# Patient Record
Sex: MALE | Race: WHITE | NOT HISPANIC OR LATINO | Employment: FULL TIME | URBAN - METROPOLITAN AREA
[De-identification: names, ages, dates, MRNs, and addresses within clinical notes are randomized per-mention and may not be internally consistent; named-entity substitution may affect disease eponyms.]

---

## 2017-01-03 ENCOUNTER — APPOINTMENT (OUTPATIENT)
Dept: URGENT CARE | Age: 27
End: 2017-01-03
Payer: COMMERCIAL

## 2017-01-03 PROCEDURE — 99203 OFFICE O/P NEW LOW 30 MIN: CPT | Performed by: FAMILY MEDICINE

## 2017-11-02 ENCOUNTER — ALLSCRIPTS OFFICE VISIT (OUTPATIENT)
Dept: OTHER | Facility: OTHER | Age: 27
End: 2017-11-02

## 2017-11-02 DIAGNOSIS — F41.8 OTHER SPECIFIED ANXIETY DISORDERS: ICD-10-CM

## 2017-11-02 DIAGNOSIS — Z13.220 ENCOUNTER FOR SCREENING FOR LIPOID DISORDERS: ICD-10-CM
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
## 2017-12-07 ENCOUNTER — OFFICE VISIT (OUTPATIENT)
Dept: URGENT CARE | Age: 27
End: 2017-12-07
Payer: COMMERCIAL

## 2017-12-07 ENCOUNTER — APPOINTMENT (OUTPATIENT)
Dept: LAB | Age: 27
End: 2017-12-07
Payer: COMMERCIAL

## 2017-12-07 ENCOUNTER — APPOINTMENT (OUTPATIENT)
Dept: LAB | Age: 27
End: 2017-12-07
Attending: PHYSICIAN ASSISTANT
Payer: COMMERCIAL

## 2017-12-07 ENCOUNTER — TRANSCRIBE ORDERS (OUTPATIENT)
Dept: ADMINISTRATIVE | Age: 27
End: 2017-12-07

## 2017-12-07 DIAGNOSIS — R30.0 DYSURIA: ICD-10-CM

## 2017-12-07 DIAGNOSIS — F41.8 OTHER SPECIFIED ANXIETY DISORDERS: ICD-10-CM

## 2017-12-07 DIAGNOSIS — Z13.220 ENCOUNTER FOR SCREENING FOR LIPOID DISORDERS: ICD-10-CM

## 2017-12-07 LAB
ALBUMIN SERPL BCP-MCNC: 4.4 G/DL (ref 3.5–5)
ALP SERPL-CCNC: 76 U/L (ref 46–116)
ALT SERPL W P-5'-P-CCNC: 23 U/L (ref 12–78)
ANION GAP SERPL CALCULATED.3IONS-SCNC: 7 MMOL/L (ref 4–13)
AST SERPL W P-5'-P-CCNC: 13 U/L (ref 5–45)
BASOPHILS # BLD AUTO: 0.02 THOUSANDS/ΜL (ref 0–0.1)
BASOPHILS NFR BLD AUTO: 0 % (ref 0–1)
BILIRUB SERPL-MCNC: 0.42 MG/DL (ref 0.2–1)
BILIRUB UR QL STRIP: NEGATIVE
BUN SERPL-MCNC: 19 MG/DL (ref 5–25)
CALCIUM SERPL-MCNC: 9.2 MG/DL (ref 8.3–10.1)
CHLORIDE SERPL-SCNC: 103 MMOL/L (ref 100–108)
CHOLEST SERPL-MCNC: 188 MG/DL (ref 50–200)
CLARITY UR: NORMAL
CO2 SERPL-SCNC: 28 MMOL/L (ref 21–32)
COLOR UR: NORMAL
CREAT SERPL-MCNC: 1.03 MG/DL (ref 0.6–1.3)
EOSINOPHIL # BLD AUTO: 0.06 THOUSAND/ΜL (ref 0–0.61)
EOSINOPHIL NFR BLD AUTO: 1 % (ref 0–6)
ERYTHROCYTE [DISTWIDTH] IN BLOOD BY AUTOMATED COUNT: 12.6 % (ref 11.6–15.1)
GFR SERPL CREATININE-BSD FRML MDRD: 100 ML/MIN/1.73SQ M
GLUCOSE (HISTORICAL): NEGATIVE
GLUCOSE P FAST SERPL-MCNC: 69 MG/DL (ref 65–99)
HCT VFR BLD AUTO: 44.6 % (ref 36.5–49.3)
HDLC SERPL-MCNC: 56 MG/DL (ref 40–60)
HGB BLD-MCNC: 15.6 G/DL (ref 12–17)
HGB UR QL STRIP.AUTO: NEGATIVE
KETONES UR STRIP-MCNC: NEGATIVE MG/DL
LDLC SERPL CALC-MCNC: 96 MG/DL (ref 0–100)
LEUKOCYTE ESTERASE UR QL STRIP: NORMAL
LYMPHOCYTES # BLD AUTO: 2.39 THOUSANDS/ΜL (ref 0.6–4.47)
LYMPHOCYTES NFR BLD AUTO: 23 % (ref 14–44)
MCH RBC QN AUTO: 30.7 PG (ref 26.8–34.3)
MCHC RBC AUTO-ENTMCNC: 35 G/DL (ref 31.4–37.4)
MCV RBC AUTO: 88 FL (ref 82–98)
MONOCYTES # BLD AUTO: 0.77 THOUSAND/ΜL (ref 0.17–1.22)
MONOCYTES NFR BLD AUTO: 7 % (ref 4–12)
NEUTROPHILS # BLD AUTO: 7.28 THOUSANDS/ΜL (ref 1.85–7.62)
NEUTS SEG NFR BLD AUTO: 69 % (ref 43–75)
NITRITE UR QL STRIP: NEGATIVE
NRBC BLD AUTO-RTO: 0 /100 WBCS
PH UR STRIP.AUTO: 5 [PH]
PLATELET # BLD AUTO: 316 THOUSANDS/UL (ref 149–390)
PMV BLD AUTO: 10.2 FL (ref 8.9–12.7)
POTASSIUM SERPL-SCNC: 3.8 MMOL/L (ref 3.5–5.3)
PROT SERPL-MCNC: 8.2 G/DL (ref 6.4–8.2)
PROT UR STRIP-MCNC: NORMAL MG/DL
RBC # BLD AUTO: 5.08 MILLION/UL (ref 3.88–5.62)
SODIUM SERPL-SCNC: 138 MMOL/L (ref 136–145)
SP GR UR STRIP.AUTO: 1.03
TRIGL SERPL-MCNC: 178 MG/DL
TSH SERPL DL<=0.05 MIU/L-ACNC: 1.27 UIU/ML (ref 0.36–3.74)
UROBILINOGEN UR QL STRIP.AUTO: 0.2
WBC # BLD AUTO: 10.55 THOUSAND/UL (ref 4.31–10.16)

## 2017-12-07 PROCEDURE — 99213 OFFICE O/P EST LOW 20 MIN: CPT | Performed by: FAMILY MEDICINE

## 2017-12-07 PROCEDURE — 36415 COLL VENOUS BLD VENIPUNCTURE: CPT

## 2017-12-07 PROCEDURE — 80061 LIPID PANEL: CPT

## 2017-12-07 PROCEDURE — 84443 ASSAY THYROID STIM HORMONE: CPT

## 2017-12-07 PROCEDURE — 85025 COMPLETE CBC W/AUTO DIFF WBC: CPT

## 2017-12-07 PROCEDURE — 81002 URINALYSIS NONAUTO W/O SCOPE: CPT | Performed by: FAMILY MEDICINE

## 2017-12-07 PROCEDURE — S9088 SERVICES PROVIDED IN URGENT: HCPCS | Performed by: FAMILY MEDICINE

## 2017-12-07 PROCEDURE — 87086 URINE CULTURE/COLONY COUNT: CPT

## 2017-12-07 PROCEDURE — 80053 COMPREHEN METABOLIC PANEL: CPT

## 2017-12-08 LAB — BACTERIA UR CULT: NORMAL

## 2017-12-09 NOTE — PROGRESS NOTES
Assessment    1  Acute UTI (599 0) (N39 0)    Plan  Acute UTI    · Ciprofloxacin HCl - 500 MG Oral Tablet; TAKE 1 TABLET TWICE DAILY   · Drink plenty of fluids ; Status:Complete;   Done: 59AOJ5831   · Resume activity to your tolerance ; Status:Complete;   Done: 33NJJ1264   · We suggest that you try a probiotic supplement ; Status:Complete;   Done: 20RFG8586  Dysuria    · (1) URINE CULTURE; Source:Urine, Clean Catch; Status:Active; Requestedfor:84Zhs4279;    · Urine Dip Non-Automated- POC; Status:Complete;   Done: 43JMT5145 07:27PM    Discussion/Summary  Medication Side Effects Reviewed: Possible side effects of new medications were reviewed with the patient/guardian today  Understands and agrees with treatment plan: The treatment plan was reviewed with the patient/guardian  The patient/guardian understands and agrees with the treatment plan   Counseling Documentation With Imm: The patient was counseled regarding instructions for management,-- prognosis,-- patient and family education,-- impressions,-- risks and benefits of treatment options,-- importance of compliance with treatment  Follow Up Instructions: Follow Up with your Primary Care Provider in 3-4 days  If your symptoms worsen, go to the nearest [place] [address] Emergency Department  Chief Complaint    1  Dysuria  Chief Complaint Free Text Note Form: Started with urethral itching and discharge on Sunday  Went to STD clinic 12/5 and had labs, urine and swab done - all WNL  Took Azithromycin 4 tabs 12/5/17  Some improvement in s/s but still has sl itchy and discharge with occ  dysuria  No fever or urgency  History of Present Illness  HPI: Patient with history as noted in chief complaint  She still has some irritation and dysuria  Hospital Based Practices Required Assessment:  Pain Assessment  the patient states they have pain  The pain is located in the dysuria   (on a scale of 0 to 10, the patient rates the pain at 3, at times ranging as high as 7 )  Abuse And Domestic Violence Screen   Yes, the patient is safe at home  -- The patient states no one is hurting them  Depression And Suicide Screen  No, the patient has not had thoughts of hurting themself  No, the patient has not felt depressed in the past 7 days  Prefered Language is  [place]  Primary Language is  English  Dysuria: Rogelio Tran presents with complaints of dysuria  Associated symptoms include urethral discharge, but-- no urgency,-- no frequency,-- no burning,-- no hematuria,-- no nocturia,-- no urethral pain,-- no suprapubic pain,-- no flank pain,-- no low back pain,-- no fever,-- no chills,-- no scrotal pain,-- no nausea-- and-- no vomiting  Review of Systems  Focused-Male:  Constitutional: as noted in HPI  Gastrointestinal: as noted in HPI  Genitourinary: as noted in HPI  Active Problems  1  Acute pain of right shoulder (719 41) (M25 511)   2  Anxiety and depression (300 00,311) (F41 8)   3  Biceps tendinitis of right shoulder (726 12) (M75 21)   4  Paronychia of great toe, left (681 11) (E30 038)    Past Medical History  1  History of asthma (V12 69) (Z87 09)   2  History of candidiasis of mouth (V12 09) (Z86 19)   3  History of discharge from penis (V13 89) (I80 441)   4  History of dysuria (V13 00) (Z87 898)   5  History of glossitis (V12 79) (Z87 19)   6  History of painful urination (V13 89) (Z87 898)   7  History of Shoulder pain (719 41) (M25 519)  Active Problems And Past Medical History Reviewed: The active problems and past medical history were reviewed and updated today  Family History  Mother    1  No pertinent family history  Father    2  No pertinent family history  Family History Reviewed: The family history was reviewed and updated today         Social History   · Caffeine use (V49 89) (F15 90)   · Never a smoker   · No illicit drug use   · No known STD risk factors   · Number of children   · Occasional alcohol use   · Occupation   · Recreational activities   · Single   · Denied: History of Takes dietary supplements  Social History Reviewed: The social history was reviewed and updated today  Surgical History    1  History of Eye Surgery Results Strabismus   2  History of Oral Surgery Tooth Extraction   3  History of Tonsillectomy With Adenoidectomy  Surgical History Reviewed: The surgical history was reviewed and updated today  Current Meds   1  ALPRAZolam 0 25 MG Oral Tablet; Take 1 tablet twice daily as needed; Therapy: 88OUW3200 to (Evaluate:02Dec2017); Last Rx:02Nov2017 Ordered   2  Sertraline HCl - 50 MG Oral Tablet; TAKE 1 TABLET DAILY; Therapy: 98TPG5209 to (Evaluate:31Jan2018)  Requested for: 00MAW9484; Last Rx:02Nov2017 Ordered  Medication List Reviewed: The medication list was reviewed and updated today  Allergies    1  No Known Drug Allergies    Vitals  Signs   Recorded: 30OCQ5041 07:13PM   Temperature: 97 6 F, Oral  Heart Rate: 84  Pulse Quality: Regular  Respiration: 18  Systolic: 163  Diastolic: 70  Height: 5 ft 7 25 in  Weight: 154 lb 6 4 oz  BMI Calculated: 24  BSA Calculated: 1 82  O2 Saturation: 98  Pain Scale: 7    Physical Exam   Constitutional  General appearance: No acute distress, well appearing and well nourished  Abdomen  Abdomen: Non-tender, no masses  Liver and spleen: No hepatomegaly or splenomegaly     Psychiatric  Orientation to person, place and time: Normal    Mood and affect: Normal        Results/Data  Urine Dip Non-Automated- POC 75AVX2919 07:27PM Edis Lewis     Test Name Result Flag Reference   Color Katey     Clarity Hazy     Leukocytes trace     Nitrite negative     Blood negative     Bilirubin negative     Urobilinogen 0 2     Protein trace     Ph 5 0     Specific Gravity 1 030     Ketone negative     Glucose negative         Future Appointments    Date/Time Provider Specialty Site   01/10/2018 09:00 AM Ruma Cavazos, [address] [facility] JUHI Martínez Electronically signed by : Cali Cool, [facility]; Dec  7 2017  8:02PM EST                       (Author)    Electronically signed by : Yojana Sainz DO; Dec  8 2017  3:26PM EST                       (Co-author)

## 2017-12-24 ENCOUNTER — OFFICE VISIT (OUTPATIENT)
Dept: URGENT CARE | Age: 27
End: 2017-12-24
Payer: COMMERCIAL

## 2017-12-24 PROCEDURE — S9088 SERVICES PROVIDED IN URGENT: HCPCS | Performed by: PREVENTIVE MEDICINE

## 2017-12-24 PROCEDURE — 99213 OFFICE O/P EST LOW 20 MIN: CPT | Performed by: PREVENTIVE MEDICINE

## 2017-12-26 NOTE — PROGRESS NOTES
Assessment   1  Acute upper respiratory infection (465 9) (J06 9)    Plan   Acute upper respiratory infection    · Amoxicillin-Pot Clavulanate 875-125 MG Oral Tablet (Augmentin); TAKE 1 TABLET    EVERY 12 HOURS DAILY    Discussion/Summary   Discussion Summary:    Continue over-the-counter remedies  no improvement in 2-3 days, begin Augmentin  Probiotics if taking  fluids/rest or motrin as needed  Medication Side Effects Reviewed: Possible side effects of new medications were reviewed with the patient/guardian today  Understands and agrees with treatment plan: The treatment plan was reviewed with the patient/guardian  The patient/guardian understands and agrees with the treatment plan    Follow Up Instructions: Follow Up with your Primary Care Provider in 5 days  If your symptoms worsen, go to the nearest [place] [address] Emergency Department  Chief Complaint   1  Cold Symptoms  Chief Complaint Free Text Note Form: nasal and chest congestion x 3 days throat      History of Present Illness   HPI: 51-year-old male presents with 3 days of nasal congestion, postnasal drainage, sore throat and productive cough  He has been taking over-the-counter Sudafed, cold and flu medications and ibuprofen  No fevers  + Sinus pressure and facial pain  Hospital Based Practices Required Assessment:      Pain Assessment      the patient states they have pain  (on a scale of 0 to 10, the patient rates the pain at 6 )      Abuse And Domestic Violence Screen       Yes, the patient is safe at home  -- The patient states no one is hurting them  Depression And Suicide Screen  No, the patient has not had thoughts of hurting themself  No, the patient has not felt depressed in the past 7 days  Review of Systems   Focused-Male:      Constitutional: feeling poorly-- and-- feeling tired, but-- no fever  ENT: earache,-- sore throat-- and-- nasal discharge        Respiratory: cough, but-- no shortness of breath-- and-- no wheezing  ROS Reviewed:    ROS reviewed  Active Problems   1  Acute pain of right shoulder (719 41) (M25 511)   2  Acute UTI (599 0) (N39 0)   3  Anxiety and depression (300 00,311) (F41 8)   4  Biceps tendinitis of right shoulder (726 12) (M75 21)   5  Dysuria (788 1) (R30 0)   6  Paronychia of great toe, left (681 11) (C74 426)    Past Medical History   1  History of asthma (V12 69) (Z87 09)   2  History of candidiasis of mouth (V12 09) (Z86 19)   3  History of discharge from penis (V13 89) (J25 316)   4  History of dysuria (V13 00) (Z87 898)   5  History of glossitis (V12 79) (Z87 19)   6  History of painful urination (V13 89) (Z87 898)   7  History of Shoulder pain (719 41) (M25 519)  Active Problems And Past Medical History Reviewed: The active problems and past medical history were reviewed and updated today  Family History   Mother    1  No pertinent family history  Father    2  No pertinent family history  Family History Reviewed: The family history was reviewed and updated today  Social History    · Caffeine use (V49 89) (F15 90)   · Never a smoker   · No illicit drug use   · No known STD risk factors   · Number of children   · Occasional alcohol use   · Occupation   · Recreational activities   · Single   · Denied: History of Takes dietary supplements  Social History Reviewed: The social history was reviewed and updated today  The social history was reviewed and is unchanged  Surgical History   1  History of Eye Surgery Results Strabismus   2  History of Oral Surgery Tooth Extraction   3  History of Tonsillectomy With Adenoidectomy  Surgical History Reviewed: The surgical history was reviewed and updated today  Current Meds    1  ALPRAZolam 0 25 MG Oral Tablet; Take 1 tablet twice daily as needed; Therapy: 46DUQ1586 to (Evaluate:37Xft8117); Last Rx:02Nov2017 Ordered   2  Sertraline HCl - 50 MG Oral Tablet; TAKE 1 TABLET DAILY;      Therapy: 39YFI7975 to (WQZXQL:41KMM3254)  Requested for: 19PCX1635; Last     Rx:02Nov2017 Ordered  Medication List Reviewed: The medication list was reviewed and updated today  Allergies   1  No Known Drug Allergies    Vitals   Signs   Recorded: 86Owc4132 03:28PM   Temperature: 98 1 F, Temporal  Heart Rate: 90  Respiration: 20  Systolic: 903  Diastolic: 70  Height: 5 ft 8 in  Weight: 155 lb   BMI Calculated: 23 57  BSA Calculated: 1 83  O2 Saturation: 98  Pain Scale: 6    Physical Exam        Constitutional      General appearance: No acute distress, well appearing and well nourished  Ears, Nose, Mouth, and Throat      External inspection of ears and nose: Normal        Otoscopic examination: Tympanic membrance translucent with normal light reflex  Canals patent without erythema  Nasal mucosa, septum, and turbinates: Abnormal   There was a mucoid discharge from both nares  The bilateral nasal mucosa was edematous  Oropharynx: Abnormal   The posterior pharynx was erythematous, but-- did not have an exudate  Pulmonary      Respiratory effort: Abnormal   Respiratory rate: normal  Respiratory Findings: wet cough  Auscultation of lungs: Clear to auscultation         Psychiatric      Orientation to person, place and time: Normal        Mood and affect: Normal        Future Appointments      Date/Time Provider Specialty Site   01/10/2018 09:00 AM Jesika Long, 10 [facility] [place] [facility] JUHI Quiñones     Signatures    Electronically signed by : Conrado Vargas, [facility]; Dec 24 2017  3:50PM EST                       (Author)     Electronically signed by : Jorgito Fragoso DO; Dec 25 2017  8:39PM EST                       (Co-author)

## 2017-12-28 ENCOUNTER — OFFICE VISIT (OUTPATIENT)
Dept: URGENT CARE | Age: 27
End: 2017-12-28
Payer: COMMERCIAL

## 2017-12-28 PROCEDURE — 99213 OFFICE O/P EST LOW 20 MIN: CPT | Performed by: FAMILY MEDICINE

## 2017-12-28 PROCEDURE — S9088 SERVICES PROVIDED IN URGENT: HCPCS | Performed by: FAMILY MEDICINE

## 2017-12-29 NOTE — PROGRESS NOTES
Assessment   1  Glossitis (529 0) (K14 0)   2  Candidiasis of mouth (112 0) (B37 0)    Plan   Candidiasis of mouth, Glossitis    · Nystatin 212169 UNIT/ML Mouth/Throat Suspension; SWISH AND SWALLOW 5ML    4 TIMES DAILY    Discussion/Summary   Discussion Summary:    Use antifungal swish as directed  After 3-4 days of medication, change tooth brush  You may use Mucinex and Flonase OTC as directed for continued relief of congestion symptoms  Ibuprofen and tylenol for fever and discomfort  If symptoms worsen or if not resolving, call your PCP or go to the ER  Precautions given  All questions answered  Medication Side Effects Reviewed: Possible side effects of new medications were reviewed with the patient/guardian today  Understands and agrees with treatment plan: The treatment plan was reviewed with the patient/guardian  The patient/guardian understands and agrees with the treatment plan    Counseling Documentation With Imm: The patient was counseled regarding instructions for management,-- risk factor reductions,-- patient and family education,-- impressions  Follow Up Instructions: Follow Up with your Primary Care Provider in 5 days  If your symptoms worsen, go to the nearest [place] [address] Emergency Department  Chief Complaint   1  Tongue Abnormalities  Chief Complaint Free Text Note Form: On Augmentin since 12/24/17 for URI  Improving but noted tongue tenderness x several days and white plague on tongue yesterday  History of Present Illness   HPI: Patient is a 23 y/o male presenting with complaint of oral thrush x yesterday  Soreness, redness, and swelling of tongue for 2 days prior to onset of white coating  Patient notes he had this once before with illness requiring antibiotic several months ago, with similar prodrome and presentation of sx  He was seen 4 days ago for tx of a URI and started on Augmentil   Prior to that he was seen earlier in the month and had a short course of Ciprofloxacin for a UTI  He continues to have sx  of nasal congestion and maxillary sinus congestion, however, feels 70% better  He is not taking any OTC medications  Hospital Based Practices Required Assessment:      Pain Assessment      the patient states they have pain  The pain is located in the tongue  The patient describes the pain as tenderness  (on a scale of 0 to 10, the patient rates the pain at 7 )      Abuse And Domestic Violence Screen       Yes, the patient is safe at home  -- The patient states no one is hurting them  Depression And Suicide Screen  No, the patient has not had thoughts of hurting themself  No, the patient has not felt depressed in the past 7 days  Prefered Language is  [place]  Primary Language is  English  Review of Systems   Focused-Male:      Constitutional: no fever-- and-- no chills  ENT: nasal discharge-- and-- No dysphagia--       The patient presents with complaints of no earache (Resolved)  The patient presents with complaints of no sore throat (resolved)      Cardiovascular: no chest pain-- and-- no palpitations  Respiratory: no shortness of breath,-- no cough-- and-- no wheezing  Gastrointestinal: no abdominal pain,-- no nausea,-- no vomiting-- and-- no diarrhea  Neurological: no headache  ROS Reviewed:    ROS reviewed  Active Problems   1  Acute pain of right shoulder (719 41) (M25 511)   2  Acute upper respiratory infection (465 9) (J06 9)   3  Acute UTI (599 0) (N39 0)   4  Anxiety and depression (300 00,311) (F41 8)   5  Biceps tendinitis of right shoulder (726 12) (M75 21)   6  Dysuria (788 1) (R30 0)   7  Paronychia of great toe, left (681 11) (E14 237)    Past Medical History   1  History of asthma (V12 69) (Z87 09)   2  History of discharge from penis (V13 89) (D09 375)   3  History of dysuria (V13 00) (Z87 898)   4  History of painful urination (V13 89) (Z87 898)   5   History of Shoulder pain (719 41) (M25 519)  Active Problems And Past Medical History Reviewed: The active problems and past medical history were reviewed and updated today  Family History   Mother    1  No pertinent family history  Father    2  No pertinent family history  Family History Reviewed: The family history was reviewed and updated today  Social History    · Caffeine use (V49 89) (F15 90)   · Never a smoker   · No illicit drug use   · No known STD risk factors   · Number of children   · Occasional alcohol use   · Occupation   · Recreational activities   · Single   · Denied: History of Takes dietary supplements  Social History Reviewed: The social history was reviewed and updated today  The social history was reviewed and is unchanged  Surgical History   1  History of Eye Surgery Results Strabismus   2  History of Oral Surgery Tooth Extraction   3  History of Tonsillectomy With Adenoidectomy  Surgical History Reviewed: The surgical history was reviewed and updated today  Current Meds    1  ALPRAZolam 0 25 MG Oral Tablet; Take 1 tablet twice daily as needed; Therapy: 53WMF8937 to (Evaluate:02Dec2017); Last Rx:02Nov2017 Ordered   2  Amoxicillin-Pot Clavulanate 875-125 MG Oral Tablet; TAKE 1 TABLET EVERY 12 HOURS     DAILY; Therapy: 40CVR0312 to ([phone])  Requested for: 24Dec2017; Last     Rx:24Dec2017 Ordered   3  Sertraline HCl - 50 MG Oral Tablet; TAKE 1 TABLET DAILY; Therapy: 55LSV2490 to (Evaluate:31Jan2018)  Requested for: 59CMK9628; Last     Rx:02Nov2017 Ordered  Medication List Reviewed: The medication list was reviewed and updated today  Allergies   1   No Known Drug Allergies    Vitals   Signs   Recorded: 28Dec2017 04:30PM   Temperature: 97 9 F, Oral  Heart Rate: 79  Pulse Quality: Regular  Respiration: 18  Systolic: 531, RUE, Sitting  Diastolic: 60, RUE, Sitting  Height: 5 ft 7 5 in  Weight: 155 lb   BMI Calculated: 23 92  BSA Calculated: 1 82  O2 Saturation: 97  Pain Scale: 7    Physical Exam        Constitutional      General appearance: No acute distress, well appearing and well nourished  Eyes      Conjunctiva and lids: No swelling, erythema, or discharge  Ears, Nose, Mouth, and Throat Tongue erythematous with mild swelling present  Small, furry white plaques present on the central posterior tongue  Erythematous posterior pharynx with clear PND  Tonsils grade 0  No exudate, edema, petechiae, or other lesions of the posterior pharynx  Pulmonary      Respiratory effort: No increased work of breathing or signs of respiratory distress  Auscultation of lungs: Clear to auscultation  Cardiovascular      Auscultation of heart: Normal rate and rhythm, normal S1 and S2, without murmurs  Musculoskeletal      Gait and station: Normal        Digits and nails: Normal without clubbing or cyanosis  Neurologic no focal deficits  Reflexes: 2+ and symmetric         Psychiatric      Orientation to person, place and time: Normal        Mood and affect: Normal        Future Appointments      Date/Time Provider Specialty Site   01/10/2018 09:00 AM Natty King, [facility] [email or web address]     Signatures    Electronically signed by : Savanah Weiss, [facility]; Dec 28 2017  4:54PM EST                       (Author)     Electronically signed by : Anoop Nair DO; Dec 28 2017  4:56PM EST                       (Co-author)

## 2018-01-08 ENCOUNTER — ALLSCRIPTS OFFICE VISIT (OUTPATIENT)
Dept: OTHER | Facility: OTHER | Age: 28
End: 2018-01-08

## 2018-01-08 ENCOUNTER — TRANSCRIBE ORDERS (OUTPATIENT)
Dept: ADMINISTRATIVE | Age: 28
End: 2018-01-08

## 2018-01-08 ENCOUNTER — GENERIC CONVERSION - ENCOUNTER (OUTPATIENT)
Dept: INTERNAL MEDICINE CLINIC | Facility: CLINIC | Age: 28
End: 2018-01-08

## 2018-01-08 ENCOUNTER — APPOINTMENT (OUTPATIENT)
Dept: LAB | Age: 28
End: 2018-01-08
Payer: COMMERCIAL

## 2018-01-08 DIAGNOSIS — M89.371 HYPERTROPHY OF BONE, RIGHT ANKLE AND FOOT: ICD-10-CM

## 2018-01-08 DIAGNOSIS — Z01.812 PRE-OPERATIVE LABORATORY EXAMINATION: Primary | ICD-10-CM

## 2018-01-08 DIAGNOSIS — Z01.812 PRE-OPERATIVE LABORATORY EXAMINATION: ICD-10-CM

## 2018-01-08 LAB
ANION GAP SERPL CALCULATED.3IONS-SCNC: 4 MMOL/L (ref 4–13)
BASOPHILS # BLD AUTO: 0.01 THOUSANDS/ΜL (ref 0–0.1)
BASOPHILS NFR BLD AUTO: 0 % (ref 0–1)
BUN SERPL-MCNC: 17 MG/DL (ref 5–25)
CALCIUM SERPL-MCNC: 9 MG/DL (ref 8.3–10.1)
CHLORIDE SERPL-SCNC: 107 MMOL/L (ref 100–108)
CO2 SERPL-SCNC: 30 MMOL/L (ref 21–32)
CREAT SERPL-MCNC: 0.83 MG/DL (ref 0.6–1.3)
EOSINOPHIL # BLD AUTO: 0.07 THOUSAND/ΜL (ref 0–0.61)
EOSINOPHIL NFR BLD AUTO: 1 % (ref 0–6)
ERYTHROCYTE [DISTWIDTH] IN BLOOD BY AUTOMATED COUNT: 12.8 % (ref 11.6–15.1)
GFR SERPL CREATININE-BSD FRML MDRD: 122 ML/MIN/1.73SQ M
GLUCOSE SERPL-MCNC: 91 MG/DL (ref 65–140)
HCT VFR BLD AUTO: 41 % (ref 36.5–49.3)
HGB BLD-MCNC: 14.2 G/DL (ref 12–17)
LYMPHOCYTES # BLD AUTO: 2.16 THOUSANDS/ΜL (ref 0.6–4.47)
LYMPHOCYTES NFR BLD AUTO: 27 % (ref 14–44)
MCH RBC QN AUTO: 30.6 PG (ref 26.8–34.3)
MCHC RBC AUTO-ENTMCNC: 34.6 G/DL (ref 31.4–37.4)
MCV RBC AUTO: 88 FL (ref 82–98)
MONOCYTES # BLD AUTO: 0.46 THOUSAND/ΜL (ref 0.17–1.22)
MONOCYTES NFR BLD AUTO: 6 % (ref 4–12)
NEUTROPHILS # BLD AUTO: 5.36 THOUSANDS/ΜL (ref 1.85–7.62)
NEUTS SEG NFR BLD AUTO: 66 % (ref 43–75)
NRBC BLD AUTO-RTO: 0 /100 WBCS
PLATELET # BLD AUTO: 321 THOUSANDS/UL (ref 149–390)
PMV BLD AUTO: 10.5 FL (ref 8.9–12.7)
POTASSIUM SERPL-SCNC: 4 MMOL/L (ref 3.5–5.3)
RBC # BLD AUTO: 4.64 MILLION/UL (ref 3.88–5.62)
SODIUM SERPL-SCNC: 141 MMOL/L (ref 136–145)
WBC # BLD AUTO: 8.07 THOUSAND/UL (ref 4.31–10.16)

## 2018-01-08 PROCEDURE — 80048 BASIC METABOLIC PNL TOTAL CA: CPT

## 2018-01-08 PROCEDURE — 85025 COMPLETE CBC W/AUTO DIFF WBC: CPT

## 2018-01-08 PROCEDURE — 36415 COLL VENOUS BLD VENIPUNCTURE: CPT

## 2018-01-10 ENCOUNTER — GENERIC CONVERSION - ENCOUNTER (OUTPATIENT)
Dept: OTHER | Facility: OTHER | Age: 28
End: 2018-01-10

## 2018-01-11 ENCOUNTER — ANESTHESIA EVENT (OUTPATIENT)
Dept: PERIOP | Facility: HOSPITAL | Age: 28
End: 2018-01-11
Payer: COMMERCIAL

## 2018-01-11 RX ORDER — SERTRALINE HYDROCHLORIDE 25 MG/1
50 TABLET, FILM COATED ORAL
COMMUNITY

## 2018-01-11 RX ORDER — ALPRAZOLAM 0.25 MG/1
0.25 TABLET ORAL 2 TIMES DAILY PRN
COMMUNITY

## 2018-01-11 NOTE — PRE-PROCEDURE INSTRUCTIONS
Pre-Surgery Instructions:   Medication Instructions    ALPRAZolam (XANAX) 0 25 mg tablet Instructed patient per Anesthesia Guidelines   sertraline (ZOLOFT) 25 mg tablet Instructed patient per Anesthesia Guidelines  Instructed to take alprazolam if needed am ofs urgery with sip of water per anesthesia

## 2018-01-12 ENCOUNTER — HOSPITAL ENCOUNTER (OUTPATIENT)
Facility: HOSPITAL | Age: 28
Setting detail: OUTPATIENT SURGERY
Discharge: HOME | End: 2018-01-12
Attending: PODIATRIST | Admitting: PODIATRIST
Payer: COMMERCIAL

## 2018-01-12 ENCOUNTER — APPOINTMENT (OUTPATIENT)
Dept: RADIOLOGY | Facility: HOSPITAL | Age: 28
End: 2018-01-12
Payer: COMMERCIAL

## 2018-01-12 ENCOUNTER — ANESTHESIA (OUTPATIENT)
Dept: PERIOP | Facility: HOSPITAL | Age: 28
End: 2018-01-12
Payer: COMMERCIAL

## 2018-01-12 ENCOUNTER — HOSPITAL ENCOUNTER (OUTPATIENT)
Dept: RADIOLOGY | Facility: HOSPITAL | Age: 28
Setting detail: OUTPATIENT SURGERY
Discharge: HOME | End: 2018-01-12
Payer: COMMERCIAL

## 2018-01-12 VITALS
DIASTOLIC BLOOD PRESSURE: 60 MMHG | SYSTOLIC BLOOD PRESSURE: 110 MMHG | TEMPERATURE: 98.1 F | HEIGHT: 68 IN | HEART RATE: 83 BPM | WEIGHT: 155 LBS | BODY MASS INDEX: 23.49 KG/M2 | OXYGEN SATURATION: 96 % | RESPIRATION RATE: 16 BRPM

## 2018-01-12 DIAGNOSIS — M79.671 RIGHT FOOT PAIN: Primary | ICD-10-CM

## 2018-01-12 DIAGNOSIS — M89.371 HYPERTROPHY OF BONE, RIGHT ANKLE AND FOOT: ICD-10-CM

## 2018-01-12 PROCEDURE — 73630 X-RAY EXAM OF FOOT: CPT

## 2018-01-12 RX ORDER — ONDANSETRON 2 MG/ML
4 INJECTION INTRAMUSCULAR; INTRAVENOUS EVERY 6 HOURS PRN
Status: DISCONTINUED | OUTPATIENT
Start: 2018-01-12 | End: 2018-01-12 | Stop reason: HOSPADM

## 2018-01-12 RX ORDER — ONDANSETRON 2 MG/ML
INJECTION INTRAMUSCULAR; INTRAVENOUS AS NEEDED
Status: DISCONTINUED | OUTPATIENT
Start: 2018-01-12 | End: 2018-01-12 | Stop reason: SURG

## 2018-01-12 RX ORDER — MAGNESIUM HYDROXIDE 1200 MG/15ML
LIQUID ORAL AS NEEDED
Status: DISCONTINUED | OUTPATIENT
Start: 2018-01-12 | End: 2018-01-12 | Stop reason: HOSPADM

## 2018-01-12 RX ORDER — FENTANYL CITRATE 50 UG/ML
INJECTION, SOLUTION INTRAMUSCULAR; INTRAVENOUS AS NEEDED
Status: DISCONTINUED | OUTPATIENT
Start: 2018-01-12 | End: 2018-01-12 | Stop reason: SURG

## 2018-01-12 RX ORDER — DEXAMETHASONE SODIUM PHOSPHATE 4 MG/ML
INJECTION, SOLUTION INTRA-ARTICULAR; INTRALESIONAL; INTRAMUSCULAR; INTRAVENOUS; SOFT TISSUE AS NEEDED
Status: DISCONTINUED | OUTPATIENT
Start: 2018-01-12 | End: 2018-01-12 | Stop reason: HOSPADM

## 2018-01-12 RX ORDER — FENTANYL CITRATE 50 UG/ML
50 INJECTION, SOLUTION INTRAMUSCULAR; INTRAVENOUS
Status: DISCONTINUED | OUTPATIENT
Start: 2018-01-12 | End: 2018-01-12 | Stop reason: HOSPADM

## 2018-01-12 RX ORDER — ACETAMINOPHEN 325 MG/1
650 TABLET ORAL EVERY 4 HOURS PRN
Status: DISCONTINUED | OUTPATIENT
Start: 2018-01-12 | End: 2018-01-12 | Stop reason: HOSPADM

## 2018-01-12 RX ORDER — OXYCODONE HYDROCHLORIDE 5 MG/1
10 TABLET ORAL EVERY 6 HOURS PRN
Status: DISCONTINUED | OUTPATIENT
Start: 2018-01-12 | End: 2018-01-12 | Stop reason: HOSPADM

## 2018-01-12 RX ORDER — PROPOFOL 10 MG/ML
INJECTION, EMULSION INTRAVENOUS AS NEEDED
Status: DISCONTINUED | OUTPATIENT
Start: 2018-01-12 | End: 2018-01-12 | Stop reason: SURG

## 2018-01-12 RX ORDER — OXYCODONE HYDROCHLORIDE 5 MG/1
5 TABLET ORAL EVERY 4 HOURS PRN
Status: DISCONTINUED | OUTPATIENT
Start: 2018-01-12 | End: 2018-01-12 | Stop reason: HOSPADM

## 2018-01-12 RX ORDER — SODIUM CHLORIDE 9 MG/ML
125 INJECTION, SOLUTION INTRAVENOUS CONTINUOUS
Status: DISCONTINUED | OUTPATIENT
Start: 2018-01-12 | End: 2018-01-12 | Stop reason: HOSPADM

## 2018-01-12 RX ORDER — MIDAZOLAM HYDROCHLORIDE 1 MG/ML
INJECTION INTRAMUSCULAR; INTRAVENOUS AS NEEDED
Status: DISCONTINUED | OUTPATIENT
Start: 2018-01-12 | End: 2018-01-12 | Stop reason: SURG

## 2018-01-12 RX ORDER — BUPIVACAINE HYDROCHLORIDE 5 MG/ML
INJECTION, SOLUTION PERINEURAL AS NEEDED
Status: DISCONTINUED | OUTPATIENT
Start: 2018-01-12 | End: 2018-01-12 | Stop reason: HOSPADM

## 2018-01-12 RX ADMIN — FENTANYL CITRATE 50 MCG: 50 INJECTION INTRAMUSCULAR; INTRAVENOUS at 11:52

## 2018-01-12 RX ADMIN — MIDAZOLAM HYDROCHLORIDE 2 MG: 1 INJECTION, SOLUTION INTRAMUSCULAR; INTRAVENOUS at 11:12

## 2018-01-12 RX ADMIN — FENTANYL CITRATE 25 MCG: 50 INJECTION INTRAMUSCULAR; INTRAVENOUS at 12:05

## 2018-01-12 RX ADMIN — ONDANSETRON HYDROCHLORIDE 4 MG: 2 INJECTION, SOLUTION INTRAVENOUS at 12:17

## 2018-01-12 RX ADMIN — SODIUM CHLORIDE 125 ML/HR: 0.9 INJECTION, SOLUTION INTRAVENOUS at 08:38

## 2018-01-12 RX ADMIN — FENTANYL CITRATE 50 MCG: 50 INJECTION INTRAMUSCULAR; INTRAVENOUS at 12:31

## 2018-01-12 RX ADMIN — DEXAMETHASONE SODIUM PHOSPHATE 4 MG: 10 INJECTION INTRAMUSCULAR; INTRAVENOUS at 11:28

## 2018-01-12 RX ADMIN — SODIUM CHLORIDE: 0.9 INJECTION, SOLUTION INTRAVENOUS at 12:16

## 2018-01-12 RX ADMIN — FENTANYL CITRATE 50 MCG: 50 INJECTION INTRAMUSCULAR; INTRAVENOUS at 12:20

## 2018-01-12 RX ADMIN — PROPOFOL 200 MG: 10 INJECTION, EMULSION INTRAVENOUS at 11:19

## 2018-01-12 RX ADMIN — CEFAZOLIN SODIUM 1000 MG: 1 SOLUTION INTRAVENOUS at 11:23

## 2018-01-12 RX ADMIN — FENTANYL CITRATE 75 MCG: 50 INJECTION INTRAMUSCULAR; INTRAVENOUS at 11:22

## 2018-01-12 NOTE — OP NOTE
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]    Procedure(s) (LRB):  NAVICULAR EXOSTECTOMY (Right)    Specimen(s):  * No specimens in log *    Estimated Blood Loss:   Minimal       Anesthesia Type:   IV Sedation with Anesthesia and a total of 8 cc of 1:1 mixture 1% lidocaine plain 0 5% Marcaine plain    Operative Indications:  Hypertrophy of bone, 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]    Hemostasis:  Pneumatic ankle tourniquet at 250 Hg for 48 minutes    Operative Findings:  Consistent with diagnosis, type 1 accessory navicular bone    Complications:   None    Procedure and Technique:  Under mild sedation, the patient was brought into the operating room and placed on the operating room table in the supine position  A pneumatic ankle tourniquet was then placed around the patient's right ankle with ample webril padding  A time out was performed to confirm the correct patient, procedure and site with all parties in agreement  Following IV sedation, local anesthetic was obtained about the patient's right foot was performed consisting of 13 ml of 1% Lidocaine and 0 5% Bupivacaine in a 1:1 mixture  The foot was then scrubbed, prepped and draped in the usual aseptic manner  An esmarch bandage was utilized to exsangunate the patients foot and the pneumatic ankle tourniquet was then inflated  The esmarch bandage was removed and the foot was placed on the operating room table  Attention was then directed to the medial aspect of the right foot where prominent navicular tuberosity was noted, incision was drawn out overlying this    An incision was made down into subcutaneous tissues taking care to retract all neurovascular structures as necessary and cauterize bleeders  Utilizing a combination sharp and blunt dissection dissection was carried down level of posterior tibial tendon which was transected in transverse type fashion  Utilizing a combination of blade and Key elevator tendon was reflected off the navicular tuberosity dorsally and plantarly to expose the prominent exostosis  Of note, a type 1 accessory navicular bone was identified and noted to be mobile along the proximal aspect of the navicular  Utilizing sterile 15 blade this was circumscribed and removed from the wound and placed on the back table  The wound was rinsed with copious amounts of sterile saline, the capsule and tendon was reapproximated utilizing 3 0 Vicryl, subcutaneous tissues were closed utilizing 4 Vicryl skin closure was obtained with 4 O nylon placed combination of simple interrupted and horizontal mattress type fashion  0 5 cc of dexamethasone was placed in the wound  A postoperative injection consisting of 4 ml of 0 5% Bupivacaine was performed  The incision site was then dressed with Betadine, Dry sterile dressing  The pneumatic ankle tourniquet was deflated and normal hyperemic flush was noted to all digits  The patient tolerated the procedure and anesthesia well and was transported to the PACU with vital signs stable        I was present for the entire procedure    Patient Disposition:  PACU  and extubated and stable    SIGNATURE: Nell Mckeon  DATE: January 12, 2018  TIME: 12:25 PM

## 2018-01-12 NOTE — ANESTHESIA PREPROCEDURE EVALUATION
Review of Systems/Medical History  Patient summary reviewed  Chart reviewed  History of anesthetic complications PONV    Cardiovascular  Negative cardio ROS    Pulmonary  Negative pulmonary ROS ,        GI/Hepatic  Negative GI/hepatic ROS          Negative  ROS        Endo/Other  Negative endo/other ROS      GYN  Negative gynecology ROS          Hematology  Negative hematology ROS      Musculoskeletal  Negative musculoskeletal ROS        Neurology  Negative neurology ROS      Psychology   Anxiety,            Physical Exam    Airway    Mallampati score: I  TM Distance: >3 FB  Neck ROM: full     Dental   No notable dental hx     Cardiovascular  Comment: Negative ROS, Rhythm: regular, Rate: normal, Cardiovascular exam normal    Pulmonary  Pulmonary exam normal Breath sounds clear to auscultation,     Other Findings        Anesthesia Plan  ASA Score- 2     Anesthesia Type- IV sedation with anesthesia with ASA Monitors  Additional Monitors:   Airway Plan:         Plan Factors-Patient not instructed to abstain from smoking on day of procedure  Patient did not smoke on day of surgery  Induction- intravenous  Postoperative Plan- Plan for postoperative opioid use  Informed Consent- Anesthetic plan and risks discussed with patient

## 2018-01-12 NOTE — DISCHARGE SUMMARY
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

## 2018-01-12 NOTE — DISCHARGE INSTRUCTIONS
[facility]  Dr Stephanie Rodriguez  Post-Operative Instructions    1  Take your prescribed medication as directed  2  Upon arrival at home, lie down and elevate your surgical foot on 2 pillows  3  Remain quiet, off your feet as much as possible, for the first 24-48 hours  This is when your feet first swell and may become painful  After 48 hours you may begin limited walking following these restrictions:   Partial weightbearing to heel of surgical foot  4  Drink large quantities of water and citrus fruit juice  Consume no alcohol  Continue a well-balanced diet  5  Report any unusual discomfort or fever to this office  6  A limited amount of discomfort and swelling is to be expected  In some cases the skin may take on a bruised appearance  The surgical solution that was applied to your foot prior to the operation is dark in color and the operation site may appear to be oozing when it actually is not  7  A slight amount of blood is to be expected, and is no cause for alarm  Do not remove the dressings  If there is active bleeding and if the bleeding persists, add additional gauze to the bandage, apply direct pressure, elevate your feet and call this office  8  Do not get the dressings wet  As regular bathing may be inconvenient, sponge baths are recommended  9  When anesthesia wears off and if any discomfort should be present, apply an ice pack directly over the operated area for 15 minute intervals for several hours or until the pain leaves  (USE IN EXCESS OF 15 MINUTES COULD CAUSE FROSTBITE)  Do not use hot water bags or electric pads  A convenient icepack can be made by placing ice cubes in a plastic bag and covering this with a towel  10  If necessary, take a mild laxative before retiring  11  Wear your special open shoes anytime you put weight on your foot, even if it is just to walk to the bathroom and back   It will probably be 2 or 3 weeks before you will be permitted to try regular bravo   12  Having performed the operation, we are interested in a prompt recovery  Please cooperate by following the above instructions  13  Please call to confirm your post-op appointment or call with any other questions

## 2018-01-14 VITALS
WEIGHT: 153.6 LBS | OXYGEN SATURATION: 99 % | HEIGHT: 67 IN | TEMPERATURE: 98.5 F | SYSTOLIC BLOOD PRESSURE: 110 MMHG | RESPIRATION RATE: 18 BRPM | HEART RATE: 100 BPM | DIASTOLIC BLOOD PRESSURE: 80 MMHG | BODY MASS INDEX: 24.11 KG/M2

## 2018-01-22 VITALS
HEART RATE: 79 BPM | DIASTOLIC BLOOD PRESSURE: 74 MMHG | HEIGHT: 68 IN | SYSTOLIC BLOOD PRESSURE: 118 MMHG | WEIGHT: 155.38 LBS | TEMPERATURE: 98.5 F | OXYGEN SATURATION: 98 % | BODY MASS INDEX: 23.55 KG/M2

## 2018-01-23 VITALS
OXYGEN SATURATION: 98 % | BODY MASS INDEX: 23.49 KG/M2 | TEMPERATURE: 98.1 F | SYSTOLIC BLOOD PRESSURE: 118 MMHG | HEART RATE: 90 BPM | WEIGHT: 155 LBS | RESPIRATION RATE: 20 BRPM | HEIGHT: 68 IN | DIASTOLIC BLOOD PRESSURE: 70 MMHG

## 2018-01-23 VITALS
HEIGHT: 67 IN | RESPIRATION RATE: 18 BRPM | OXYGEN SATURATION: 98 % | SYSTOLIC BLOOD PRESSURE: 100 MMHG | TEMPERATURE: 97.6 F | DIASTOLIC BLOOD PRESSURE: 70 MMHG | WEIGHT: 154.4 LBS | HEART RATE: 84 BPM | BODY MASS INDEX: 24.23 KG/M2

## 2018-01-23 VITALS
HEART RATE: 79 BPM | OXYGEN SATURATION: 97 % | TEMPERATURE: 97.9 F | SYSTOLIC BLOOD PRESSURE: 108 MMHG | BODY MASS INDEX: 23.49 KG/M2 | DIASTOLIC BLOOD PRESSURE: 60 MMHG | WEIGHT: 155 LBS | HEIGHT: 68 IN | RESPIRATION RATE: 18 BRPM

## 2018-01-23 NOTE — CONSULTS
Chief Complaint  Patient is here for a Pre-Op exam  He is scheduled for Osteotomy Right Navicular for hypertrophy of bone right foot at [facility] on 1/12/2018 by Dr Mickey Hodges, [facility] [address] [place] [address], [address] phone: [phone] fax: [phone]  History of Present Illness  Pre-Op Visit (Brief): The patient is being seen for a preoperative visit  Surgical Risk Assessment:   Prior Anesthesia: He had prior anesthesia and no prior adverse reaction to general anesthesia  Pertinent Past Medical History: Depression/Anxiety  Exercise Capacity: able to walk four blocks without symptoms and able to walk two flights of stairs without symptoms  Lifestyle Factors: denies tobacco use, denies illegal drug use and Occasional EtOH use approx 1 night per week, 3-4 mixed drinks  Symptoms: no easy bleeding, no easy bruising, no chest pain, no cough, no dyspnea, no edema, no palpitations and no wheezing  STOP questionnaire score is 1  Other FRANCIA risk factors include male gender, but normal BMI, normal neck circumference and age under 48  Predicted risk of FRANCIA: None  Pertinent Family History: no fam hx of adverse rxn to anesthesia  Living Situation: home is secure and supportive and no post-op concerns with his living situation  Review of Systems    Constitutional: no fever and no chills  Cardiovascular: no chest pain and no palpitations  Respiratory: as noted in HPI  Gastrointestinal: no abdominal pain, no nausea, no vomiting, no constipation, no diarrhea and no blood in stools  Genitourinary: no dysuria  ROS reviewed  Active Problems    1  Acute pain of right shoulder (719 41) (M25 511)   2  Acute upper respiratory infection (465 9) (J06 9)   3  Acute UTI (599 0) (N39 0)   4  Anxiety and depression (300 00,311) (F41 8)   5  Biceps tendinitis of right shoulder (726 12) (M75 21)   6  Candidiasis of mouth (112 0) (B37 0)   7  Dysuria (788 1) (R30 0)   8  Glossitis (529 0) (K14 0)   9  Paronychia of great toe, left (681 11) (W54 624)    Past Medical History    · History of asthma (V12 69) (Z87 09)   · History of discharge from penis (V13 89) (H54 804)   · History of dysuria (V13 00) (U04 247)   · History of painful urination (V13 89) (K59 601)   · History of Shoulder pain (719 41) (M25 519)    The active problems and past medical history were reviewed and updated today  Surgical History    · History of Eye Surgery Results Strabismus   · History of Oral Surgery Tooth Extraction   · History of Tonsillectomy With Adenoidectomy    The surgical history was reviewed and updated today  Family History    · No pertinent family history    · No pertinent family history    The family history was reviewed and updated today  Social History    · Caffeine use (V49 89) (F15 90)   · He admits to consuming caffeine via coffee ( 2 servings per month ) and tea ( 1 serving      per week ) soda (1 serving per day ) and chocolate ( 1 serving per month)   · Never a smoker   · No illicit drug use   · No known STD risk factors   · Number of children   · none   · Occasional alcohol use   · Occupation   · Student   · Recreational activities   · He enjoys rifle, wreck team and diving r   · Single   · Denied: History of Takes dietary supplements  The social history was reviewed and updated today  The social history was reviewed and is unchanged  Current Meds   1  ALPRAZolam 0 25 MG Oral Tablet; Take 1 tablet twice daily as needed; Therapy: 78SYS1146 to (Evaluate:77Mru2435); Last Rx:02Nov2017 Ordered   2  Sertraline HCl - 50 MG Oral Tablet; TAKE 1 TABLET DAILY; Therapy: 16GNF8332 to (Evaluate:31Jan2018)  Requested for: 53CFY6059; Last   Rx:02Nov2017 Ordered    The medication list was reviewed and updated today  Allergies    1   No Known Drug Allergies    Vitals  Signs    Temperature: 98 5 F, Tympanic  Heart Rate: 79  Systolic: 439, LUE, Sitting  Diastolic: 74, LUE, Sitting  Height: 5 ft 7 64 in  Weight: 155 lb 6 oz  BMI Calculated: 23 88  BSA Calculated: 1 83  O2 Saturation: 98    Physical Exam    Constitutional   General appearance: No acute distress, well appearing and well nourished  Ears, Nose, Mouth, and Throat   External inspection of ears and nose: Normal     Oropharynx: Normal with no erythema, edema, exudate or lesions  Pulmonary   Respiratory effort: No increased work of breathing or signs of respiratory distress  Auscultation of lungs: Clear to auscultation  Cardiovascular   Auscultation of heart: Normal rate and rhythm, normal S1 and S2, no murmurs  Abdomen   Abdomen: Non-tender, no masses  Liver and spleen: No hepatomegaly or splenomegaly  Lymphatic   Palpation of lymph nodes in neck: No lymphadenopathy  Psychiatric   Mood and affect: Normal        Results/Data  EKG/ECG- POC 02CDS2728 03:54PM [facility] [place]     Test Name Result Flag Reference   EKG/ECG 01/08/2018       A 12 lead ECG was performed and was normal    Rhythm and rate: normal sinus rhythm  P-waves: OK interval is normal, but the P wave is normal    QRS: the QRS is normal    ST segment: the ST segments are normal    Comparison to prior ECGs:  no prior ECGs were available for comparison  Assessment    1  Pre-op evaluation (V72 84) (Z01 818)    Plan  Pre-op evaluation    · EKG/ECG- POC; Status:Complete - Retrospective By Protocol Authorization;   Done:  23GRR1567 03:54PM    Discussion/Summary  Surgical Clearance: He is at a LOW risk from a cardiovascular standpoint at this time without any additional cardiac testing  Reevaluation needed, if he should present with symptoms prior to surgery/procedure  Labs from Dec 2017 reviewed  Patient is to get his CBC and BMP done prior to surgery  Counseled on elevated Triglyceride level  Encouraged to exercise and eat low fat diet     The patient was counseled regarding instructions for management, risk factor reductions, prognosis, risks and benefits of treatment options, importance of compliance with treatment  Possible side effects of new medications were reviewed with the patient/guardian today  The treatment plan was reviewed with the patient/guardian  The patient/guardian understands and agrees with the treatment plan      End of Encounter Meds    1  ALPRAZolam 0 25 MG Oral Tablet (Xanax); Take 1 tablet twice daily as needed; Therapy: 98UNC9742 to (Evaluate:76Zyk1855); Last Rx:02Nov2017 Ordered   2  Sertraline HCl - 50 MG Oral Tablet; TAKE 1 TABLET DAILY;    Therapy: 35PHG0316 to (Evaluate:31Jan2018)  Requested for: 80JKS8367; Last   Rx:02Nov2017 Ordered    Signatures   Electronically signed by : Funmilayo Williamson DO; Jan 8 2018  3:49PM EST                       (Author)    Electronically signed by : Joann Menard MD; Jan 8 2018  4:31PM EST                       (Author)

## 2018-01-24 VITALS
WEIGHT: 157.5 LBS | TEMPERATURE: 97.8 F | OXYGEN SATURATION: 98 % | SYSTOLIC BLOOD PRESSURE: 118 MMHG | DIASTOLIC BLOOD PRESSURE: 62 MMHG | HEIGHT: 68 IN | HEART RATE: 79 BPM | BODY MASS INDEX: 23.87 KG/M2

## 2019-06-03 ENCOUNTER — TELEPHONE (OUTPATIENT)
Dept: INTERNAL MEDICINE CLINIC | Facility: CLINIC | Age: 29
End: 2019-06-03

## 2022-11-15 ENCOUNTER — TELEPHONE (OUTPATIENT)
Dept: PALLIATIVE MEDICINE | Facility: CLINIC | Age: 32
End: 2022-11-15

## 2022-11-15 DIAGNOSIS — B96.89 ACUTE BACTERIAL SINUSITIS: Primary | ICD-10-CM

## 2022-11-15 DIAGNOSIS — J01.90 ACUTE BACTERIAL SINUSITIS: Primary | ICD-10-CM

## 2022-11-15 RX ORDER — AMOXICILLIN AND CLAVULANATE POTASSIUM 875; 125 MG/1; MG/1
1 TABLET, FILM COATED ORAL EVERY 12 HOURS SCHEDULED
Qty: 20 TABLET | Refills: 0 | Status: SHIPPED | OUTPATIENT
Start: 2022-11-15 | End: 2022-11-25

## 2022-11-15 NOTE — TELEPHONE ENCOUNTER
11/15/2022 3:36 PM -  Pt has been suffering with URI symptoms for 7-10 days  Called phone triage line  No lingering fever, no sore throat, no dyspnea  Cough productive of greenish sputum  Very congested bilateral nose  Pt endorses no allergies, and no other significant health problems  Does not have insurance at this time, otherwise would have used clinic  Advised to use Netti pot with boiled water to avoid Naegleria infestation, and offered ABx  Pt grateful for these, and will call back with any complications or concerns

## (undated) DEVICE — CHLORAPREP HI-LITE 26ML ORANGE

## (undated) DEVICE — CURITY NON-ADHERENT STRIPS: Brand: CURITY

## (undated) DEVICE — STRETCH BANDAGE: Brand: CURITY

## (undated) DEVICE — GLOVE SRG BIOGEL 8

## (undated) DEVICE — GAUZE SPONGES,16 PLY: Brand: CURITY

## (undated) DEVICE — SYRINGE 10ML LL

## (undated) DEVICE — 2000CC GUARDIAN II: Brand: GUARDIAN

## (undated) DEVICE — INTENDED FOR TISSUE SEPARATION, AND OTHER PROCEDURES THAT REQUIRE A SHARP SURGICAL BLADE TO PUNCTURE OR CUT.: Brand: BARD-PARKER ® CARBON RIB-BACK BLADES

## (undated) DEVICE — TUBING SUCTION 5MM X 12 FT

## (undated) DEVICE — REM POLYHESIVE ADULT PATIENT RETURN ELECTRODE: Brand: VALLEYLAB

## (undated) DEVICE — CUFF TOURNIQUET 24 X 4 IN QUICK CONNECT DISP 1BLA

## (undated) DEVICE — NEEDLE 25G X 1 1/2

## (undated) DEVICE — UNIVERSAL  MINOR EXTREMITY PK: Brand: CARDINAL HEALTH

## (undated) DEVICE — PAD CAST 4 IN COTTON NON STERILE

## (undated) DEVICE — 10FR FRAZIER SUCTION HANDLE: Brand: CARDINAL HEALTH

## (undated) DEVICE — SCD SEQUENTIAL COMPRESSION COMFORT SLEEVE MEDIUM KNEE LENGTH: Brand: KENDALL SCD